# Patient Record
Sex: FEMALE | Race: WHITE | Employment: OTHER | URBAN - METROPOLITAN AREA
[De-identification: names, ages, dates, MRNs, and addresses within clinical notes are randomized per-mention and may not be internally consistent; named-entity substitution may affect disease eponyms.]

---

## 2017-01-06 ENCOUNTER — HOSPITAL ENCOUNTER (OUTPATIENT)
Dept: CT IMAGING | Facility: CLINIC | Age: 72
End: 2017-01-06
Attending: INTERNAL MEDICINE | Admitting: INTERNAL MEDICINE
Payer: MEDICARE

## 2017-01-06 ENCOUNTER — APPOINTMENT (OUTPATIENT)
Dept: GENERAL RADIOLOGY | Facility: CLINIC | Age: 72
End: 2017-01-06
Attending: RADIOLOGY
Payer: MEDICARE

## 2017-01-06 ENCOUNTER — HOSPITAL ENCOUNTER (OUTPATIENT)
Facility: CLINIC | Age: 72
Discharge: HOME | End: 2017-01-06
Attending: INTERNAL MEDICINE | Admitting: INTERNAL MEDICINE
Payer: MEDICARE

## 2017-01-06 VITALS
RESPIRATION RATE: 16 BRPM | OXYGEN SATURATION: 98 % | SYSTOLIC BLOOD PRESSURE: 112 MMHG | HEART RATE: 94 BPM | TEMPERATURE: 96.9 F | DIASTOLIC BLOOD PRESSURE: 65 MMHG

## 2017-01-06 DIAGNOSIS — R91.8 RIGHT LOWER LOBE LUNG MASS: ICD-10-CM

## 2017-01-06 DIAGNOSIS — Z85.89 HISTORY OF MALIGNANT NEOPLASM METASTATIC TO LUNG: ICD-10-CM

## 2017-01-06 PROCEDURE — 88305 TISSUE EXAM BY PATHOLOGIST: CPT | Mod: 26 | Performed by: RADIOLOGY

## 2017-01-06 PROCEDURE — 25000125 ZZHC RX 250: Performed by: INTERNAL MEDICINE

## 2017-01-06 PROCEDURE — 88172 CYTP DX EVAL FNA 1ST EA SITE: CPT | Mod: 26 | Performed by: RADIOLOGY

## 2017-01-06 PROCEDURE — 88161 CYTOPATH SMEAR OTHER SOURCE: CPT | Mod: 26 | Performed by: RADIOLOGY

## 2017-01-06 PROCEDURE — 32405 CT LUNG MEDIASTINUM BIOPSY: CPT

## 2017-01-06 PROCEDURE — 88161 CYTOPATH SMEAR OTHER SOURCE: CPT | Performed by: RADIOLOGY

## 2017-01-06 PROCEDURE — 88172 CYTP DX EVAL FNA 1ST EA SITE: CPT | Performed by: RADIOLOGY

## 2017-01-06 PROCEDURE — 00000159 ZZHCL STATISTIC H-SEND OUTS PREP: Performed by: RADIOLOGY

## 2017-01-06 PROCEDURE — 88275 CYTOGENETICS 100-300: CPT | Performed by: RADIOLOGY

## 2017-01-06 PROCEDURE — 88305 TISSUE EXAM BY PATHOLOGIST: CPT | Performed by: RADIOLOGY

## 2017-01-06 PROCEDURE — 25000125 ZZHC RX 250: Performed by: RADIOLOGY

## 2017-01-06 PROCEDURE — 88173 CYTOPATH EVAL FNA REPORT: CPT | Performed by: RADIOLOGY

## 2017-01-06 PROCEDURE — 99152 MOD SED SAME PHYS/QHP 5/>YRS: CPT

## 2017-01-06 PROCEDURE — 88173 CYTOPATH EVAL FNA REPORT: CPT | Mod: 26 | Performed by: RADIOLOGY

## 2017-01-06 PROCEDURE — 40000940 XR CHEST 1 VW

## 2017-01-06 PROCEDURE — 88377 M/PHMTRC ALYS ISHQUANT/SEMIQ: CPT | Performed by: RADIOLOGY

## 2017-01-06 PROCEDURE — 99153 MOD SED SAME PHYS/QHP EA: CPT

## 2017-01-06 PROCEDURE — 40000863 ZZH STATISTIC RADIOLOGY XRAY, US, CT, MAR, NM

## 2017-01-06 PROCEDURE — 88271 CYTOGENETICS DNA PROBE: CPT | Performed by: RADIOLOGY

## 2017-01-06 RX ORDER — LIDOCAINE HYDROCHLORIDE 10 MG/ML
10 INJECTION, SOLUTION EPIDURAL; INFILTRATION; INTRACAUDAL; PERINEURAL ONCE
Status: COMPLETED | OUTPATIENT
Start: 2017-01-06 | End: 2017-01-06

## 2017-01-06 RX ORDER — LIDOCAINE 40 MG/G
CREAM TOPICAL
Status: DISCONTINUED | OUTPATIENT
Start: 2017-01-06 | End: 2017-01-06 | Stop reason: HOSPADM

## 2017-01-06 RX ORDER — NALOXONE HYDROCHLORIDE 0.4 MG/ML
.1-.4 INJECTION, SOLUTION INTRAMUSCULAR; INTRAVENOUS; SUBCUTANEOUS
Status: DISCONTINUED | OUTPATIENT
Start: 2017-01-06 | End: 2017-01-06 | Stop reason: HOSPADM

## 2017-01-06 RX ORDER — FLUMAZENIL 0.1 MG/ML
0.2 INJECTION, SOLUTION INTRAVENOUS
Status: DISCONTINUED | OUTPATIENT
Start: 2017-01-06 | End: 2017-01-06 | Stop reason: HOSPADM

## 2017-01-06 RX ORDER — LIDOCAINE HYDROCHLORIDE 10 MG/ML
1-30 INJECTION, SOLUTION EPIDURAL; INFILTRATION; INTRACAUDAL; PERINEURAL
Status: DISCONTINUED | OUTPATIENT
Start: 2017-01-06 | End: 2017-01-06 | Stop reason: HOSPADM

## 2017-01-06 RX ORDER — FENTANYL CITRATE 50 UG/ML
25-50 INJECTION, SOLUTION INTRAMUSCULAR; INTRAVENOUS EVERY 5 MIN PRN
Status: DISCONTINUED | OUTPATIENT
Start: 2017-01-06 | End: 2017-01-06 | Stop reason: HOSPADM

## 2017-01-06 RX ADMIN — FENTANYL CITRATE 50 MCG: 50 INJECTION, SOLUTION INTRAMUSCULAR; INTRAVENOUS at 09:55

## 2017-01-06 RX ADMIN — LIDOCAINE HYDROCHLORIDE 100 MG: 10 INJECTION, SOLUTION EPIDURAL; INFILTRATION; INTRACAUDAL; PERINEURAL at 10:07

## 2017-01-06 RX ADMIN — MIDAZOLAM HYDROCHLORIDE 1 MG: 1 INJECTION, SOLUTION INTRAMUSCULAR; INTRAVENOUS at 09:56

## 2017-01-06 NOTE — PROGRESS NOTES
Pt returned to [facility] from radiology via cart.  Pt denies pain, nausea or vomiting.  Will await chest xray results from Dr. Correa.

## 2017-01-06 NOTE — PROGRESS NOTES
Pre procedure plan of care reviewed with pt prior to sedation.  All questions answered and pt appears to understand.  D/C instructions also reviewed prior to sedation.

## 2017-01-06 NOTE — PROGRESS NOTES
Son in room with patient now.  Discharge instructions given to patient's son.  No further questions at this time.

## 2017-01-06 NOTE — PROGRESS NOTES
Care Suites Arrival    Reason for Visit: lung biopsy  Arrival interventions:none    Pt resting in bed/recyliner, denies additional needs at this time, call light in reach.  Waiting for procedure.

## 2017-01-06 NOTE — PROGRESS NOTES
"Chest xray completed and \"OK\" per Dr Correa.  Pt requests only water at this time and pt updated regarding D/C plan  "

## 2017-01-06 NOTE — PROGRESS NOTES
RADIOLOGY PROCEDURE NOTE  Patient name: Celina Ariza  MRN: [id number]  : 1945    Pre-procedure diagnosis: Lung lesion  Post-procedure diagnosis: Same    Procedure Date/Time: 2017  10:20 AM  Procedure:biopsy of  Lung lesion  Estimated blood loss: none  Specimen(s) collected with description: Lung tissue  The patient tolerated the procedure well with no immediate complications.  Preliminary pathology showed malignant cells.   See imaging dictation for procedural details and findings.    Provider name: Henrik Correa

## 2017-01-09 LAB — COPATH REPORT: NORMAL

## 2017-01-12 ENCOUNTER — HOSPITAL ENCOUNTER (OUTPATIENT)
Dept: PHYSICAL THERAPY | Facility: CLINIC | Age: 72
Setting detail: THERAPIES SERIES
End: 2017-01-12
Attending: PHYSICAL MEDICINE & REHABILITATION
Payer: MEDICARE

## 2017-01-12 ENCOUNTER — HOSPITAL ENCOUNTER (OUTPATIENT)
Dept: SPEECH THERAPY | Facility: CLINIC | Age: 72
Setting detail: THERAPIES SERIES
End: 2017-01-12
Attending: PHYSICAL MEDICINE & REHABILITATION
Payer: MEDICARE

## 2017-01-12 PROCEDURE — G8979 MOBILITY GOAL STATUS: HCPCS | Mod: GP,CI | Performed by: PHYSICAL THERAPIST

## 2017-01-12 PROCEDURE — 40000719 ZZHC STATISTIC PT DEPARTMENT NEURO VISIT: Performed by: PHYSICAL THERAPIST

## 2017-01-12 PROCEDURE — 97110 THERAPEUTIC EXERCISES: CPT | Mod: GP | Performed by: PHYSICAL THERAPIST

## 2017-01-12 PROCEDURE — G8980 MOBILITY D/C STATUS: HCPCS | Mod: GP,CI | Performed by: PHYSICAL THERAPIST

## 2017-01-12 PROCEDURE — 97750 PHYSICAL PERFORMANCE TEST: CPT | Mod: GP | Performed by: PHYSICAL THERAPIST

## 2017-01-12 PROCEDURE — 40000211 ZZHC STATISTIC SLP  DEPARTMENT VISIT: Performed by: SPEECH-LANGUAGE PATHOLOGIST

## 2017-01-12 PROCEDURE — 97532 ZZHC SP COGNITIVE SKILLS EA 15 MIN: CPT | Mod: GN | Performed by: SPEECH-LANGUAGE PATHOLOGIST

## 2017-01-12 NOTE — PROGRESS NOTES
01/12/17 1400   Signing Clinician's Name / Credentials   Signing clinician's name / credentials QING Cui PT   Functional Gait Assessment (CHARO Ye, EZEQUIEL Padilla, et al. (2004))   1. GAIT LEVEL SURFACE 2   2. CHANGE IN GAIT SPEED 3   3. GAIT WITH HORIZONTAL HEAD TURNS 3   4. GAIT WITH VERTICAL HEAD TURNS 3   5. GAIT AND PIVOT TURN 3   6. STEP OVER OBSTACLE 3   7. GAIT WITH NARROW BASE OF SUPPORT 0   8. GAIT WITH EYES CLOSED 0   9. AMBULATING BACKWARDS 1   10. STEPS 2   Total Functional Gait Assessment Score   TOTAL SCORE: (MAXIMUM SCORE 30) 20   Functional Gait Assessment (FGA): The FGA assesses postural stability during various walking tasks.   Gait assistive device used: no AD     Patient Score: 20/30  Scores of <22/30 have been correlated with predicting falls in community-dwelling older adults according to Ephraim & Herbie 2010.   Scores of <18/30 have been correlated with increased risk for falls in patients with Parkinsons Disease according to Louie Zhang, Neff et al 2014.  Minimal Detectable Change for patients with acute/chronic stroke = 4.2 according to Thiconsuelo & Ritschel 2009  Minimal Detectable Change for patients with vestibular disorder = 8 according to Ephraim & Herbie 2010    Assessment (rationale for performing, application to patient s function & care plan): The pt continued to have difficulty with gait speed during higher level tasks including ambulating with eyes closed and retroambulation.   Minutes billed as physical performance test: 10

## 2017-01-13 ENCOUNTER — HOSPITAL ENCOUNTER (OUTPATIENT)
Dept: OCCUPATIONAL THERAPY | Facility: CLINIC | Age: 72
Setting detail: THERAPIES SERIES
End: 2017-01-13
Attending: PHYSICAL MEDICINE & REHABILITATION
Payer: MEDICARE

## 2017-01-13 PROCEDURE — 97110 THERAPEUTIC EXERCISES: CPT | Mod: GO | Performed by: OCCUPATIONAL THERAPIST

## 2017-01-13 PROCEDURE — 97535 SELF CARE MNGMENT TRAINING: CPT | Mod: GO,59 | Performed by: OCCUPATIONAL THERAPIST

## 2017-01-13 PROCEDURE — 40000125 ZZHC STATISTIC OT OUTPT VISIT: Performed by: OCCUPATIONAL THERAPIST

## 2017-01-13 PROCEDURE — 97530 THERAPEUTIC ACTIVITIES: CPT | Mod: GO | Performed by: OCCUPATIONAL THERAPIST

## 2017-01-18 ENCOUNTER — HOSPITAL ENCOUNTER (OUTPATIENT)
Dept: OCCUPATIONAL THERAPY | Facility: CLINIC | Age: 72
Setting detail: THERAPIES SERIES
End: 2017-01-18
Attending: PHYSICAL MEDICINE & REHABILITATION
Payer: MEDICARE

## 2017-01-18 PROCEDURE — 40000125 ZZHC STATISTIC OT OUTPT VISIT: Performed by: REHABILITATION PRACTITIONER

## 2017-01-18 PROCEDURE — 97110 THERAPEUTIC EXERCISES: CPT | Mod: GO | Performed by: REHABILITATION PRACTITIONER

## 2017-01-18 PROCEDURE — 97530 THERAPEUTIC ACTIVITIES: CPT | Mod: GO | Performed by: REHABILITATION PRACTITIONER

## 2017-01-19 PROCEDURE — 40000125 ZZHC STATISTIC OT OUTPT VISIT: Performed by: REHABILITATION PRACTITIONER

## 2017-01-19 PROCEDURE — 97530 THERAPEUTIC ACTIVITIES: CPT | Mod: GO | Performed by: REHABILITATION PRACTITIONER

## 2017-01-19 PROCEDURE — 97110 THERAPEUTIC EXERCISES: CPT | Mod: GO | Performed by: REHABILITATION PRACTITIONER

## 2017-01-20 ENCOUNTER — HOSPITAL ENCOUNTER (OUTPATIENT)
Dept: SPEECH THERAPY | Facility: CLINIC | Age: 72
Setting detail: THERAPIES SERIES
End: 2017-01-20
Attending: PHYSICAL MEDICINE & REHABILITATION
Payer: MEDICARE

## 2017-01-20 LAB — COPATH REPORT: NORMAL

## 2017-01-20 PROCEDURE — G9169 MEMORY GOAL STATUS: HCPCS | Mod: GN,CJ | Performed by: SPEECH-LANGUAGE PATHOLOGIST

## 2017-01-20 PROCEDURE — 40000211 ZZHC STATISTIC SLP  DEPARTMENT VISIT: Performed by: SPEECH-LANGUAGE PATHOLOGIST

## 2017-01-20 PROCEDURE — G9168 MEMORY CURRENT STATUS: HCPCS | Mod: GN,CJ | Performed by: SPEECH-LANGUAGE PATHOLOGIST

## 2017-01-20 PROCEDURE — G9170 MEMORY D/C STATUS: HCPCS | Mod: GN,CJ | Performed by: SPEECH-LANGUAGE PATHOLOGIST

## 2017-01-20 PROCEDURE — 97532 ZZHC SP COGNITIVE SKILLS EA 15 MIN: CPT | Mod: GN | Performed by: SPEECH-LANGUAGE PATHOLOGIST

## 2017-01-23 NOTE — PROGRESS NOTES
Outpatient Speech Language Pathology Discharge Note     Patient: Celina Ariza  : 1945    Beginning/End Dates of Reporting Period:  2016 to 2017, total of 5 sessions    Referring Provider: Dr. Manuelito Adam    Therapy Diagnosis: Mild cognitive-linguistic impairment    Client Self Report: Patient reports she feels she is doing well and that she would like to be done with treatment.     Objective Measurements: See below:      Goals:  Goal Identifier Verbal Expression   Goal Description Patient will learn, implement and demonstrate use of 2 strategies to improve word-finding during communication breakdowns independently across 4/5 opportunities with SLP to improve verbal expression for everyday language tasks.   Target Date 17   Date Met  17   Progress: Goal met and dismissed.      Goal Identifier Memory   Goal Description Patient will learn, implement, and demonstrate use of 3 internal or external memory strategies to improve recall on a complex task (e.g. recall medical information for 24+ hours) independently to meet daily memory needs.    Target Date 17   Date Met  17   Progress: Goal met and dismissed.      Goal Identifier Attention   Goal Description Patient will attend to details across the entire page of a reading task with 90% accy and no cues to meet daily reading and visual attention needs.    Target Date 17   Date Met      Progress: Goal not met and dismissed. Map scanning and divided attention task at accuracy of 83% with min-mod assist as of d/c.      Goal Identifier Reasoning   Goal Description Patient will complete a complex reasoning task (e.g. problem solve solutions to a medical emergency) with 90% accy as judged by SLP with min assist to meet daily problem solving needs.    Target Date 17   Date Met  17   Progress: Goal met and dismissed.      Progress Toward Goals:   Progress this reporting period: Patient has learned and implemented  multiple strategies to improve cognitive-linguistic skills. She is using a daily planner per SLP recommendation, in addition to her home calendar, to manage appointments and new information. Patient is able to use repetition and rehearsal strategies to improve recall of new auditory information. She has learned and is able to use independently strategies to improve word-finding when communication breakdowns occur. She has also improved her complex reasoning skills for organizing information and problem solving novel tasks. Patient continues to have most difficulty with complex attention, particularly when visual attention is involved. For example, she struggled to identify items across a map (visual attention) and had difficulty retaining prompts while attempting to scan (divided, alternating attention). She understands that this area is still weak and reports she is motivated to continue to address it at home independently.     Plan: Discharge from therapy.  Discharge: Yes.   Reason for Discharge: Patient has met most of her goals.  She chooses to discontinue therapy at this time.  Discharge Plan: No further skilled intervention at this time. Patient given education on strategies and tools/ideas for continuing to improve and challenge language and cognition in home/community settings post-discharge.     Thank you for your referral of this patient.      Anupama Muro MA, CCC-SLP  Speech-Language Pathology  [facility]  Phone: [phone]  Pager: [phone]

## 2017-01-24 DIAGNOSIS — H53.10 SUBJECTIVE VISUAL DISTURBANCE: Primary | ICD-10-CM

## 2017-01-24 NOTE — PROGRESS NOTES
Outpatient Physical Therapy Discharge Note     Patient: Celina Ariza  : 1945    Beginning/End Dates of Reporting Period:  2016 to 2017    Referring Provider: Dr. Manuelito Adam    Therapy Diagnosis: Difficulty walking, impaired balance, weakness     Client Self Report: States that she is feeling good. Back to exercising regularly. Feels like she is ready for discharge.     Objective Measurements:  Objective Measure: 6MWT  Details: 1330    Objective Measure: FGA  Details:     Objective Measure: Facit  Details: 46       Outcome Measures (most recent score):  Will take [facility] on discharge from OT/SLP    Goals:  Goal Identifier 6MWT   Goal Description Client will improve 6MWT by 150 ft or better to demonstrate improve activity tolerance   Target Date 17   Date Met   17   Progress: MET     Goal Identifier FGA   Goal Description Client will demonstrate improved balance by scoring at least a 26 or better on FGA.    Target Date 17   Date Met      Progress: Not met     Goal Identifier FACIT   Goal Description Client will report improved level of fatigue by subjectvely reporting at least a 10 point improvement on FACIT scale.    Target Date 17   Date Met   17   Progress:     Progress Toward Goals:   Progress this reporting period: The pt made great gains with her strength, activity tolerance, and balance. She improved her 6MWT by over 150'. Her score on the FACIT also improved indicating less fatigue during daily activities. The pt has not had any falls and feels like her balance is continuing to improve. She is attending group exercise classes multiple days/week at the [facility]. The pt is also completing a HEP independently. She states that she feels comfortable continuing to progress dynamic balance independently.     Plan:  Discharge from therapy.    Discharge:    Reason for Discharge: Pt feels comfortable progressing dynamic balance  independently      Discharge Plan: Patient to continue home program.

## 2017-01-25 ENCOUNTER — HOSPITAL ENCOUNTER (OUTPATIENT)
Dept: OCCUPATIONAL THERAPY | Facility: CLINIC | Age: 72
Setting detail: THERAPIES SERIES
End: 2017-01-25
Attending: PHYSICAL MEDICINE & REHABILITATION
Payer: MEDICARE

## 2017-01-25 PROCEDURE — 40000249 ZZH STATISTIC VISIT LOW VISION CLINIC: Performed by: REHABILITATION PRACTITIONER

## 2017-01-25 PROCEDURE — 97537 COMMUNITY/WORK REINTEGRATION: CPT | Mod: GO | Performed by: REHABILITATION PRACTITIONER

## 2017-01-25 PROCEDURE — 97530 THERAPEUTIC ACTIVITIES: CPT | Mod: GO | Performed by: REHABILITATION PRACTITIONER

## 2017-01-26 ENCOUNTER — TELEPHONE (OUTPATIENT)
Dept: OPHTHALMOLOGY | Facility: CLINIC | Age: 72
End: 2017-01-26

## 2017-01-26 DIAGNOSIS — H53.40 VISUAL FIELD DEFECT: ICD-10-CM

## 2017-01-26 DIAGNOSIS — H53.10 SUBJECTIVE VISUAL DISTURBANCE: Primary | ICD-10-CM

## 2017-01-26 NOTE — TELEPHONE ENCOUNTER
----- Message from Vandana Mg OT sent at 1/25/2017  4:22 PM CST -----  Regarding: appointment tomorrow  Hello,    I am currently working with Celina in OT and Low vision in [place] through [facility].  Celina is scoring below safe ranges on pre-driving screen related to scanning and reaction time.  She demonstrates delayed scanning to left visual field and observed inattention.  Her recall is intact despite the inattention and her awareness of her deficits has improved.  She appears to have a constricted visual field in left periphery with approximate measurement of 63 dergees temporally using subjective measure and periometer.      She reports that her most recent eye glass prescription causes blurring in the distance and that her older glasses work better for her.  She thinks she may have had some acuity changes.    I look forward to seeing the results of your exam to determine if she in fact has had some visual field changes.      Please call with questions,  [phone]  Vandana Mg OTR/L  [facility]

## 2017-01-31 ENCOUNTER — TRANSFERRED RECORDS (OUTPATIENT)
Dept: HEALTH INFORMATION MANAGEMENT | Facility: CLINIC | Age: 72
End: 2017-01-31

## 2017-02-01 ENCOUNTER — HOSPITAL ENCOUNTER (OUTPATIENT)
Dept: OCCUPATIONAL THERAPY | Facility: CLINIC | Age: 72
Setting detail: THERAPIES SERIES
End: 2017-02-01
Attending: PHYSICAL MEDICINE & REHABILITATION
Payer: MEDICARE

## 2017-02-01 PROCEDURE — 97530 THERAPEUTIC ACTIVITIES: CPT | Mod: GO | Performed by: REHABILITATION PRACTITIONER

## 2017-02-01 PROCEDURE — 40000125 ZZHC STATISTIC OT OUTPT VISIT: Performed by: REHABILITATION PRACTITIONER

## 2017-02-07 ENCOUNTER — TRANSFERRED RECORDS (OUTPATIENT)
Dept: HEALTH INFORMATION MANAGEMENT | Facility: CLINIC | Age: 72
End: 2017-02-07

## 2017-02-10 ENCOUNTER — OFFICE VISIT (OUTPATIENT)
Dept: OPHTHALMOLOGY | Facility: CLINIC | Age: 72
End: 2017-02-10
Attending: OPHTHALMOLOGY
Payer: MEDICARE

## 2017-02-10 DIAGNOSIS — H47.20 PARTIAL OPTIC ATROPHY: Primary | ICD-10-CM

## 2017-02-10 DIAGNOSIS — H53.10 SUBJECTIVE VISUAL DISTURBANCE: ICD-10-CM

## 2017-02-10 DIAGNOSIS — H53.40 VISUAL FIELD DEFECT: ICD-10-CM

## 2017-02-10 PROCEDURE — 92133 CPTRZD OPH DX IMG PST SGM ON: CPT | Mod: ZF | Performed by: OPHTHALMOLOGY

## 2017-02-10 PROCEDURE — 99215 OFFICE O/P EST HI 40 MIN: CPT | Mod: ZF

## 2017-02-10 PROCEDURE — 92083 EXTENDED VISUAL FIELD XM: CPT | Mod: ZF | Performed by: OPHTHALMOLOGY

## 2017-02-10 ASSESSMENT — SLIT LAMP EXAM - LIDS
COMMENTS: NORMAL
COMMENTS: NORMAL

## 2017-02-10 ASSESSMENT — REFRACTION_WEARINGRX
OD_CYLINDER: +0.25
OS_CYLINDER: +1.25
OS_AXIS: 147
OS_ADD: +2.50
OD_AXIS: 143
OD_SPHERE: +1.50
OD_ADD: +2.50
OS_SPHERE: -0.75

## 2017-02-10 ASSESSMENT — VISUAL ACUITY
OD_CC+: +2
OS_CC: 20/20
METHOD: SNELLEN - LINEAR
CORRECTION_TYPE: GLASSES
OD_CC: 20/40
OS_CC+: -2

## 2017-02-10 ASSESSMENT — CONF VISUAL FIELD
OS_NORMAL: 1
OD_INFERIOR_NASAL_RESTRICTION: 3

## 2017-02-10 ASSESSMENT — TONOMETRY
OD_IOP_MMHG: 20
IOP_METHOD: TONOPEN
OS_IOP_MMHG: 21

## 2017-02-10 ASSESSMENT — CUP TO DISC RATIO
OD_RATIO: 0.4
OS_RATIO: 0.3

## 2017-02-10 ASSESSMENT — EXTERNAL EXAM - LEFT EYE: OS_EXAM: NORMAL

## 2017-02-10 ASSESSMENT — EXTERNAL EXAM - RIGHT EYE: OD_EXAM: NORMAL

## 2017-02-10 NOTE — LETTER
February 10, 2017    RE: Celina Ariza  : 1945  MRN: [id number]    Dear Colleagues,    Thank you for referring your patient, Celina Ariza, to my neuro-ophthalmology clinic recently.  After a thorough neuro-ophthalmic history and examination, I came to the following conclusions:     1. Metastatic lung cancer status post recent right frontal craniotomy with metastasis resection from the right frontal lobe in 2016:    - During rehab following cancer resection there was a concern raised regarding the patient's vision.  I suspect she may have had a right gaze preference / mild left gaze palsy.  Resection of a right frontal lobe tumor can affect the frontal eye field leading to impaired leftward movement of both eyes.  This may range from mild impairment to complete lack of leftward movement.    - In the case of gaze deviation from frontal eye field impairment, typically these sequelae are temporary lasting weeks before resolving.  - today the patient has normal efferent ocular motor function including normal saccades.      - The location of the lesion should not affect visual fields and indeed her visual fields were relatively full today aside from mild nasal constriction in the right eye.    2. 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    Oncologist ([facility]): Fouzia Smith MD      Again, thank you for trusting me with the care of your patient.  For further exam details, please feel free to contact our office for additional records.  If you wish to contact me regarding this patient please email me at [email or web address] or give my clinic a call to arrange a phone conversation.    Sincerely,    Julien Mcguire MD  , Neuro-Ophthalmology and Adult Strabismus  Department of Ophthalmology and Visual Neurosciences  [facility]    DX: optic atrophy, remote demyelinating optic neuritis, possible supranuclear gaze deviation

## 2017-02-10 NOTE — PROGRESS NOTES
ASSESSMENT AND PLAN:         1. Metastatic lung cancer status post recent right frontal craniotomy with metastasis resection from the right frontal lobe in November 2016:    - During rehab following cancer resection there was a concern raised regarding the patient's vision.  I suspect she may have had a right gaze preference / mild left gaze palsy.  Resection of a right frontal lobe tumor can affect the frontal eye field leading to impaired leftward movement of both eyes.  This may range from mild impairment to complete lack of leftward movement.    - In the case of gaze deviation from frontal eye field impairment, typically these sequelae are temporary lasting weeks before resolving.  - today the patient has normal efferent ocular motor function including normal saccades.      - The location of the lesion should not affect visual fields and indeed her visual fields were relatively full today aside from mild nasal constriction in the right eye.    2. 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    Oncologist ([facility]): Fouzia Smith MD         Complete documentation of historical and exam elements from today's encounter can be found in the full encounter summary report (not reduplicated in this progress note).  I personally obtained the chief complaint(s)  and history of present illness.  I confirmed and edited as necessary the review of systems, past medical/surgical history, family history, social history, and examination findings as documented by others; and I examined the patient myself.  I personally reviewed the relevant tests, images, and reports as documented above.  I formulated and edited as necessary the assessment and plan and discussed the findings and management plan with the patient and family   Julien Mcguire MD  4:56 PM  2/10/2017

## 2017-02-10 NOTE — NURSING NOTE
Chief Complaints and History of Present Illnesses   Patient presents with     Neurologic Problem     impaired cognition     HPI    Affected eye(s):  Both   Symptoms:           Do you have eye pain now?:  No      Comments:  Celina Ariza is a 71 year old female presenting with a history of:    1. 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   - Will start radiation treatment with Dr. Barrett on Monday Feb 13, 2017.   - Last brain MRI 2 weeks ago (in [place]. Unsure where).   - after the surgery, has had problems in peripheral vision in the left eye.   - spent 4-5 days at [facility]. Has been doing outpatient care since then, and therapist recommended neuro-ophthlamologist. Wants her drivers license back.   - no vision concerns.   - no headaches  - no pain.    2. Multiple Sclerosis  - Diagnosed 25 years ago.   - no flare-ups.  - optic neuritis in right eye 25 years ago. Caused some field loss temporally in the left eye.  - but MRI showed multiple lesions per patient account.       Lucrecia GUERRA 9:59 AM February 10, 2017

## 2017-02-15 ENCOUNTER — HOSPITAL ENCOUNTER (OUTPATIENT)
Dept: OCCUPATIONAL THERAPY | Facility: CLINIC | Age: 72
Setting detail: THERAPIES SERIES
End: 2017-02-15
Attending: PHYSICAL MEDICINE & REHABILITATION
Payer: MEDICARE

## 2017-02-15 PROCEDURE — 97530 THERAPEUTIC ACTIVITIES: CPT | Mod: GO | Performed by: REHABILITATION PRACTITIONER

## 2017-02-15 PROCEDURE — 97535 SELF CARE MNGMENT TRAINING: CPT | Mod: GO,59 | Performed by: REHABILITATION PRACTITIONER

## 2017-02-15 PROCEDURE — 97110 THERAPEUTIC EXERCISES: CPT | Mod: GO | Performed by: REHABILITATION PRACTITIONER

## 2017-02-15 PROCEDURE — 40000125 ZZHC STATISTIC OT OUTPT VISIT: Performed by: REHABILITATION PRACTITIONER

## 2017-02-15 NOTE — PROGRESS NOTES
[facility]      OUTPATIENT OCCUPATIONAL THERAPY  PLAN OF TREATMENT FOR OUTPATIENT REHABILITATION    Patient's Last Name, First Name, M.I.                YOB: 1945  Celina Ariza                        Provider's Name  [facility] Medical Record No.  [id number]                               Onset Date: 11/21/16   Start of Care Date: 12/15/16   Type:     ___PT   _X_OT   ___SLP Medical Diagnosis: Brain Mets, cognitive impairment                       OT Diagnosis: Impaired ADL/IADL I      _________________________________________________________________________________  Plan of Treatment:    Frequency/Duration: 1x every 2 weeks x 12 weeks     Goals:    Goal Identifier Community Mobility   Goal Description Patient will demonstrate WFLs UE motor and visual reaction time for improved visual skills and safe independent community mobility (crossing the street, driving, etc.) by scoring WNLs on all modes of the Dynavision.   Target Date 05/03/17   Date Met      Progress:     Goal Identifier Visual Attention   Goal Description Patient to verbalize and demonstrate at least 2 strategies to utilize during functional tasks to increase awareness of her L visual field for increased independence and safety.   Target Date 05/03/17   Date Met      Progress:     Goal Identifier Strengthening   Goal Description Patient will demonstrate increased bilateral pinch strength (lateral and palmar pinch) by 3# each for increased independence with ADL/IADLs such as opening containers, pull up pants, maintaining pinch to hold items, etc.   Target Date 05/03/17   Date Met      Progress:     Goal Identifier 4 Energy conservation   Goal Description Pt will identify 2 energy conservation strategies to improve endurance for ADL's and use daily with improve FACIT fatigue scale score and pt report.    Target  Date 05/03/17   Date Met      Progress:       Progress Toward Goals:   Progress this reporting period: See progress note    Certification date from 2/15/17 to 5/3/17.    Vandana Mg OT          I CERTIFY THE NEED FOR THESE SERVICES FURNISHED UNDER        THIS PLAN OF TREATMENT AND WHILE UNDER MY CARE     (Physician co-signature of this document indicates review and certification of the therapy plan).                Referring Provider: Dr Adam

## 2017-02-15 NOTE — PROGRESS NOTES
Outpatient Occupational Therapy Progress Note     Patient: Rocael Sandy  : 1945  Insurance:   Payor/Plan Subscriber Name Rel Member # Group #   MEDICARE - MEDICARE F* ROCAEL SANDY*  [id number]       ATTN CLAIMS, [address]   MEDICA - MEDICA PRIME* ROCAEL SANDY*  [id number]      [address] [address]       Beginning/End Dates of Reporting Period:  12/15/16 to 2/15/2017    Referring Provider: Dr. Adam    Therapy Diagnosis: Impaired ADL/IADL I    Client Self Report:   Pt tearful in today's session as she started chemo this week.  Pt accepted recommendation for counseling services.  Reports she has decreased her activity level as a result of starting chemo.     Objective Measurements:     Objective Measure: Dynavision Mode A    Details: 56, 52 (average is 52+), Continuous 225 (200+ safe range)   Objective Measure: Dynavision Mode B    Details: 30, 32 (average is 42+), 21 (7/10), 22 (10/10) 1 second interval) (35+ considered safe range)   Objective Measure: Scancourse   Details: 18/20 in 18 seconds, 20/20 in 23 seconds.    Objective Measure:  strength   Details: R 35 (was 31#), left 26 ( was 20# )    Objective Measure: Dynavision Mode B divided attention 2 second interval   Details: 45 (9/10#) 2 second interval, 22 (9/10) 1 second interval (35+ is considered safe range)   Objective Measure: Pinch Strength   Details: RIght 10 (was 8), left 9 (was 7)      Goals:     Goal Identifier Community Mobility   Goal Description Patient will demonstrate WFLs UE motor and visual reaction time for improved visual skills and safe independent community mobility (crossing the street, driving, etc.) by scoring WNLs on all modes of the Dynavision.   Target Date 17   Date Met      Progress:Pt is scoring within safe rang on mode A and mode B 2 second interval.  Is scoring within safe range in all other areas tested related to safety and driving.       Goal Identifier Visual Attention   Goal Description Patient  to verbalize and demonstrate at least 2 strategies to utilize during functional tasks to increase awareness of her L visual field for increased independence and safety.   Target Date 05/03/17   Date Met      Progress:Goal addressed and pt demonstrates improved scanning and attention to left extra personal space.      Goal Identifier Strengthening   Goal Description Patient will demonstrate increased bilateral pinch strength (lateral and palmar pinch) by 3# each for increased independence with ADL/IADLs such as opening containers, pull up pants, maintaining pinch to hold items, etc.   Target Date 05/03/17   Date Met      Progress:Goal addressed and remains active.      Goal Identifier 4 Energy conservation   Goal Description Pt will identify 2 energy conservation strategies to improve endurance for ADL's and use daily with improve FACIT fatigue scale score and pt report.    Target Date 05/03/17   Date Met      Progress:       Progress Toward Goals:   Progress this reporting period: Celina has made progress towards all goals.  She continues to demonstrate medical need for OT and new goal added for energy conservation as a result of chemo treatment.  Pt is motivated to improve, but hesitant to schedule at this time.      Plan:  Continue therapy per current plan of care.  Changes to goals: Goal 4 added    Discharge:  No

## 2017-03-15 NOTE — PROGRESS NOTES
Outpatient Occupational Therapy Discharge Note     Patient: Rocael Sandy  : 1945  Insurance:   Payor/Plan Subscriber Name Rel Member # Group #   MEDICARE - MEDICARE F* ROCAEL SANDY*  [id number]       ATTN CLAIMS, [address]   MEDICA - MEDICA PRIME* ROCAEL SANDY*  [id number]      [address] [address]       Beginning/End Dates of Reporting Period:  12/15/16-2/15/17    Referring Provider: Dr. Manuelito Adam     Therapy Diagnosis: Impaired ADL/IADL I    Client Self Report:   Pt reports she is tolerating chemo and radiation well and is almost done with treatment.  She continues to exercise at the [facility].    Objective Measurements:     Objective Measure: Dynavision Mode A    Details: 56, 52 (average is 52+), Continuous 225 (200+ safe range)   Objective Measure: Dynavision Mode B    Details: 30, 32 (average is 42+), 21 (7/10), 22 (10/10) 1 second interval) (35+ considered safe range)   Objective Measure: Scancourse   Details: 18/20 in 18 seconds, 20/20 in 23 seconds.    Objective Measure:  strength   Details: R 35 (was 31#), left 26 ( was 20# )    Objective Measure: Dynavision Mode B divided attention 2 second interval   Details: 45 (9/10#) 2 second interval, 22 (9/10) 1 second interval (35+ is considered safe range)   Objective Measure: Pinch Strength   Details: RIght 10 (was 8), left 9 (was 7)      Goals:     Goal Identifier Community Mobility   Goal Description Patient will demonstrate WFLs UE motor and visual reaction time for improved visual skills and safe independent community mobility (crossing the street, driving, etc.) by scoring WNLs on all modes of the Dynavision.   Target Date 17   Date Met  02/15/17   Progress:     Goal Identifier Visual Attention   Goal Description Patient to verbalize and demonstrate at least 2 strategies to utilize during functional tasks to increase awareness of her L visual field for increased independence and safety.   Target Date 17   Date Met  02/15/17    Progress:     Goal Identifier Strengthening   Goal Description Patient will demonstrate increased bilateral pinch strength (lateral and palmar pinch) by 3# each for increased independence with ADL/IADLs such as opening containers, pull up pants, maintaining pinch to hold items, etc.   Target Date 05/03/17   Date Met  02/15/17   Progress:     Goal Identifier 4 Energy conservation   Goal Description Pt will identify 2 energy conservation strategies to improve endurance for ADL's and use daily with improve FACIT fatigue scale score and pt report.    Target Date 05/03/17   Date Met  02/15/17   Progress:       Progress Toward Goals:   Progress this reporting period: All goals have been addressed and met    Plan:  Discharge from therapy.    Discharge:    Reason for Discharge: Patient has met all goals.    Equipment Issued: putty    Discharge Plan: Patient to continue home program.

## 2017-03-29 ENCOUNTER — TRANSFERRED RECORDS (OUTPATIENT)
Dept: HEALTH INFORMATION MANAGEMENT | Facility: CLINIC | Age: 72
End: 2017-03-29

## 2020-09-22 ENCOUNTER — MEDICAL CORRESPONDENCE (OUTPATIENT)
Dept: HEALTH INFORMATION MANAGEMENT | Facility: CLINIC | Age: 75
End: 2020-09-22

## 2020-09-22 DIAGNOSIS — C34.31 SMALL CELL LUNG CANCER, RIGHT LOWER LOBE (H): Primary | ICD-10-CM

## 2020-09-24 DIAGNOSIS — Z01.818 EXAMINATION PRIOR TO CHEMOTHERAPY: ICD-10-CM

## 2020-09-24 DIAGNOSIS — C34.90 NON-SMALL CELL LUNG CANCER, UNSPECIFIED LATERALITY (H): Primary | ICD-10-CM

## 2020-10-05 ENCOUNTER — HOSPITAL ENCOUNTER (OUTPATIENT)
Dept: CARDIOLOGY | Facility: CLINIC | Age: 75
Discharge: HOME | End: 2020-10-05
Attending: NURSE PRACTITIONER | Admitting: NURSE PRACTITIONER
Payer: COMMERCIAL

## 2020-10-05 DIAGNOSIS — Z01.818 EXAMINATION PRIOR TO CHEMOTHERAPY: ICD-10-CM

## 2020-10-05 DIAGNOSIS — C34.90 NON-SMALL CELL LUNG CANCER, UNSPECIFIED LATERALITY (H): ICD-10-CM

## 2020-10-05 PROCEDURE — 93306 TTE W/DOPPLER COMPLETE: CPT | Mod: 26 | Performed by: INTERNAL MEDICINE

## 2020-10-05 PROCEDURE — 93306 TTE W/DOPPLER COMPLETE: CPT

## (undated) RX ORDER — NALOXONE HYDROCHLORIDE 0.4 MG/ML
INJECTION, SOLUTION INTRAMUSCULAR; INTRAVENOUS; SUBCUTANEOUS
Status: DISPENSED
Start: 2017-01-06

## (undated) RX ORDER — FENTANYL CITRATE 50 UG/ML
INJECTION, SOLUTION INTRAMUSCULAR; INTRAVENOUS
Status: DISPENSED
Start: 2017-01-06

## (undated) RX ORDER — FLUMAZENIL 0.1 MG/ML
INJECTION, SOLUTION INTRAVENOUS
Status: DISPENSED
Start: 2017-01-06